# Patient Record
Sex: FEMALE | Employment: FULL TIME | URBAN - METROPOLITAN AREA
[De-identification: names, ages, dates, MRNs, and addresses within clinical notes are randomized per-mention and may not be internally consistent; named-entity substitution may affect disease eponyms.]

---
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
## 2023-07-10 ENCOUNTER — APPOINTMENT (OUTPATIENT)
Dept: CT IMAGING | Facility: CLINIC | Age: 23
End: 2023-07-10
Attending: PHYSICIAN ASSISTANT
Payer: COMMERCIAL

## 2023-07-10 ENCOUNTER — HOSPITAL ENCOUNTER (EMERGENCY)
Facility: CLINIC | Age: 23
Discharge: HOME | End: 2023-07-10
Admitting: PHYSICIAN ASSISTANT
Payer: COMMERCIAL

## 2023-07-10 VITALS
DIASTOLIC BLOOD PRESSURE: 75 MMHG | SYSTOLIC BLOOD PRESSURE: 133 MMHG | OXYGEN SATURATION: 100 % | HEART RATE: 51 BPM | RESPIRATION RATE: 16 BRPM | TEMPERATURE: 97.6 F | BODY MASS INDEX: 22.2 KG/M2 | WEIGHT: 130 LBS | HEIGHT: 64 IN

## 2023-07-10 DIAGNOSIS — S01.112A EYEBROW LACERATION, LEFT, INITIAL ENCOUNTER: ICD-10-CM

## 2023-07-10 DIAGNOSIS — R55 SYNCOPE: ICD-10-CM

## 2023-07-10 LAB
ANION GAP SERPL CALCULATED.3IONS-SCNC: 10 MMOL/L (ref 5–18)
ATRIAL RATE - MUSE: 53 BPM
BASOPHILS # BLD AUTO: 0 10E3/UL (ref 0–0.2)
BASOPHILS NFR BLD AUTO: 0 %
BUN SERPL-MCNC: 10 MG/DL (ref 8–22)
CALCIUM SERPL-MCNC: 9.1 MG/DL (ref 8.5–10.5)
CHLORIDE BLD-SCNC: 106 MMOL/L (ref 98–107)
CO2 SERPL-SCNC: 23 MMOL/L (ref 22–31)
CREAT SERPL-MCNC: 0.78 MG/DL (ref 0.6–1.1)
DIASTOLIC BLOOD PRESSURE - MUSE: NORMAL MMHG
EOSINOPHIL # BLD AUTO: 0.1 10E3/UL (ref 0–0.7)
EOSINOPHIL NFR BLD AUTO: 1 %
ERYTHROCYTE [DISTWIDTH] IN BLOOD BY AUTOMATED COUNT: 12.5 % (ref 10–15)
GFR SERPL CREATININE-BSD FRML MDRD: >90 ML/MIN/1.73M2
GLUCOSE BLD-MCNC: 91 MG/DL (ref 70–125)
HCG SERPL QL: NEGATIVE
HCT VFR BLD AUTO: 40.9 % (ref 35–47)
HGB BLD-MCNC: 14 G/DL (ref 11.7–15.7)
IMM GRANULOCYTES # BLD: 0 10E3/UL
IMM GRANULOCYTES NFR BLD: 0 %
INTERPRETATION ECG - MUSE: NORMAL
LYMPHOCYTES # BLD AUTO: 1.5 10E3/UL (ref 0.8–5.3)
LYMPHOCYTES NFR BLD AUTO: 14 %
MAGNESIUM SERPL-MCNC: 1.9 MG/DL (ref 1.8–2.6)
MCH RBC QN AUTO: 31.2 PG (ref 26.5–33)
MCHC RBC AUTO-ENTMCNC: 34.2 G/DL (ref 31.5–36.5)
MCV RBC AUTO: 91 FL (ref 78–100)
MONOCYTES # BLD AUTO: 0.5 10E3/UL (ref 0–1.3)
MONOCYTES NFR BLD AUTO: 4 %
NEUTROPHILS # BLD AUTO: 8.3 10E3/UL (ref 1.6–8.3)
NEUTROPHILS NFR BLD AUTO: 81 %
NRBC # BLD AUTO: 0 10E3/UL
NRBC BLD AUTO-RTO: 0 /100
P AXIS - MUSE: 45 DEGREES
PLATELET # BLD AUTO: 170 10E3/UL (ref 150–450)
POTASSIUM BLD-SCNC: 4.2 MMOL/L (ref 3.5–5)
PR INTERVAL - MUSE: 130 MS
QRS DURATION - MUSE: 88 MS
QT - MUSE: 416 MS
QTC - MUSE: 390 MS
R AXIS - MUSE: 56 DEGREES
RBC # BLD AUTO: 4.49 10E6/UL (ref 3.8–5.2)
SODIUM SERPL-SCNC: 139 MMOL/L (ref 136–145)
SYSTOLIC BLOOD PRESSURE - MUSE: NORMAL MMHG
T AXIS - MUSE: 34 DEGREES
VENTRICULAR RATE- MUSE: 53 BPM
WBC # BLD AUTO: 10.4 10E3/UL (ref 4–11)

## 2023-07-10 PROCEDURE — 96361 HYDRATE IV INFUSION ADD-ON: CPT

## 2023-07-10 PROCEDURE — 96374 THER/PROPH/DIAG INJ IV PUSH: CPT | Mod: 59

## 2023-07-10 PROCEDURE — 85025 COMPLETE CBC W/AUTO DIFF WBC: CPT | Performed by: EMERGENCY MEDICINE

## 2023-07-10 PROCEDURE — 258N000003 HC RX IP 258 OP 636: Performed by: PHYSICIAN ASSISTANT

## 2023-07-10 PROCEDURE — 84703 CHORIONIC GONADOTROPIN ASSAY: CPT | Performed by: EMERGENCY MEDICINE

## 2023-07-10 PROCEDURE — 12011 RPR F/E/E/N/L/M 2.5 CM/<: CPT

## 2023-07-10 PROCEDURE — 70450 CT HEAD/BRAIN W/O DYE: CPT

## 2023-07-10 PROCEDURE — 82310 ASSAY OF CALCIUM: CPT | Performed by: EMERGENCY MEDICINE

## 2023-07-10 PROCEDURE — 99285 EMERGENCY DEPT VISIT HI MDM: CPT | Mod: 25

## 2023-07-10 PROCEDURE — 250N000013 HC RX MED GY IP 250 OP 250 PS 637: Performed by: PHYSICIAN ASSISTANT

## 2023-07-10 PROCEDURE — 83735 ASSAY OF MAGNESIUM: CPT | Performed by: EMERGENCY MEDICINE

## 2023-07-10 PROCEDURE — 36415 COLL VENOUS BLD VENIPUNCTURE: CPT | Performed by: EMERGENCY MEDICINE

## 2023-07-10 PROCEDURE — 93005 ELECTROCARDIOGRAM TRACING: CPT | Mod: XU | Performed by: EMERGENCY MEDICINE

## 2023-07-10 PROCEDURE — 250N000011 HC RX IP 250 OP 636: Mod: JZ | Performed by: PHYSICIAN ASSISTANT

## 2023-07-10 PROCEDURE — 70486 CT MAXILLOFACIAL W/O DYE: CPT

## 2023-07-10 PROCEDURE — 90471 IMMUNIZATION ADMIN: CPT | Performed by: PHYSICIAN ASSISTANT

## 2023-07-10 PROCEDURE — 90715 TDAP VACCINE 7 YRS/> IM: CPT | Performed by: PHYSICIAN ASSISTANT

## 2023-07-10 RX ORDER — ONDANSETRON 2 MG/ML
4 INJECTION INTRAMUSCULAR; INTRAVENOUS ONCE
Status: COMPLETED | OUTPATIENT
Start: 2023-07-10 | End: 2023-07-10

## 2023-07-10 RX ORDER — IBUPROFEN 600 MG/1
600 TABLET, FILM COATED ORAL ONCE
Status: COMPLETED | OUTPATIENT
Start: 2023-07-10 | End: 2023-07-10

## 2023-07-10 RX ADMIN — ONDANSETRON 4 MG: 2 INJECTION INTRAMUSCULAR; INTRAVENOUS at 16:50

## 2023-07-10 RX ADMIN — IBUPROFEN 600 MG: 600 TABLET ORAL at 20:56

## 2023-07-10 RX ADMIN — CLOSTRIDIUM TETANI TOXOID ANTIGEN (FORMALDEHYDE INACTIVATED), CORYNEBACTERIUM DIPHTHERIAE TOXOID ANTIGEN (FORMALDEHYDE INACTIVATED), BORDETELLA PERTUSSIS TOXOID ANTIGEN (GLUTARALDEHYDE INACTIVATED), BORDETELLA PERTUSSIS FILAMENTOUS HEMAGGLUTININ ANTIGEN (FORMALDEHYDE INACTIVATED), BORDETELLA PERTUSSIS PERTACTIN ANTIGEN, AND BORDETELLA PERTUSSIS FIMBRIAE 2/3 ANTIGEN 0.5 ML: 5; 2; 2.5; 5; 3; 5 INJECTION, SUSPENSION INTRAMUSCULAR at 19:25

## 2023-07-10 RX ADMIN — SODIUM CHLORIDE 1000 ML: 9 INJECTION, SOLUTION INTRAVENOUS at 16:50

## 2023-07-10 ASSESSMENT — ENCOUNTER SYMPTOMS
NECK PAIN: 0
HEADACHES: 1
DIZZINESS: 1
VOMITING: 1
WOUND: 1
CONFUSION: 1
ABDOMINAL PAIN: 1

## 2023-07-10 NOTE — ED TRIAGE NOTES
Pt presents via EMS after having episode of syncope and hitting head on a counter. [facility] having [facility]. Pt was complaining of abdominal cramping and staff stepped out of the room when episode occurred. Pt does not remember what happened. Endorses a lot of abdominal cramping. No cervical tenderness noted     Triage Assessment     Row Name 07/10/23 2412       Triage Assessment (Adult)    Airway WDL WDL       Respiratory WDL    Respiratory WDL WDL       Skin Circulation/Temperature WDL    Skin Circulation/Temperature WDL WDL       Cardiac WDL    Cardiac WDL WDL       Peripheral/Neurovascular WDL    Peripheral Neurovascular WDL WDL       Cognitive/Neuro/Behavioral WDL    Cognitive/Neuro/Behavioral WDL WDL

## 2023-07-10 NOTE — ED PROVIDER NOTES
EMERGENCY DEPARTMENT ENCOUNTER      NAME: Melyssa Kaplan  AGE: 23 year old female  YOB: 2000  MRN: [id number]  EVALUATION DATE & TIME: No admission date for patient encounter.    PCP: Matt Mariscal    ED PROVIDER: Renato Ballard PA-C      Chief Complaint   Patient presents with     Syncope     Head Injury         FINAL IMPRESSION:  1. Syncope    2. Eyebrow laceration, left, initial encounter          ED COURSE & MEDICAL DECISION MAKING:    Pertinent Labs & Imaging studies reviewed. (See chart for details)  4:49 PM I met the patient and performed my initial interview and exam.   7:21 PM I rechecked patient.  8:49 PM We discussed plans for discharge including supportive cares, symptomatic treatment, outpatient follow up, and reasons to return to the emergency department.      23 year old female presents to the Emergency Department for evaluation of syncope, head injury.     ED Course as of 07/10/23 2313   Mon Jul 10, 2023   1700 Patient is a 23-year-old female, presents emergency department from OB/GYN clinic, she had an IUD placed today, reportedly staff stepped outside the room, patient was leaning on the table, and reportedly may have had a syncopal event, falling and striking the left side of her face.  She does have a 1 cm laceration to the eyelid on the left side, as well as some facial tenderness.  She does not remember anything associated with the event.  I was called out to triage for patient as she reportedly had another episode where she was unresponsive, and then vomited significantly.  Not having any fevers.  Feeling much improved now that she is sitting up in wheelchair.  Started some fluids, given seem to Zofran.  Negative pregnancy.  BMP normal, CBC normal, magnesium normal.  EKG was obtained, and does not show any acute abnormalities.  Neuro examination here otherwise grossly unremarkable.  She does not have any focal deficits.  Given all of this, and the fact that she had  another recurrence of nausea, vomiting, will obtain CT of the head, facial bones to ensure there is no fractures.  She does have a laceration that will require some intervention, discussed glue versus stitches.  Unfortunately this laceration is to the skin of the eyelid on the left side, and will be a very difficult closure regardless.  Otherwise normal examination here.  She has no midline cervical, thoracic, or lumbar pain.  No significant abdominal pain, rebound or guarding.  No CVA tenderness.  No other focal symptoms.  Abdomen feeling well up until today.  Plan for fluids, Zofran, imaging, reassessment.   1922 1 cm laceration to the left upper eyelid, was able to be repaired with glue here.  I was able to closely approximate the edges.  Laceration was fairly deep, however came together nicely.  Bleeding was well controlled.  I do not appreciate any foreign body.  Still pending CT scans of the head at this point.   2023 Independently reviewed the patient's CT head, I do not appreciate any intracranial bleeding or hemorrhage.   2046 CT Facial Bones without Contrast  No localized soft tissue swelling, inflammation.  No facial bone fracture or malalignment.  Normal CT of the head, normal CT of the facial bones.   2311 Patient seen and reevaluated following CT scans.  No evidence of any acute intracranial bleeding.  Suspect that she likely has a mild concussion.  Has remained normal here after fluids and Zofran.  Suspect this is likely a syncope, vasovagal episode following the procedure here in clinic today.  No evidence of any abnormal activity such as seizure-like activity, or signs of infection.  Laboratory studies here are stable.  EKG did not show any acute abnormalities.  Head CT and facial bone CT normal.  Laceration was repaired here in the emergency department.  Patient is agreeable with all of these plans, and will discharge home.  We will provide some additional nausea medicine for use over the next  couple days.  Discussed return precautions, patient and family expressed understanding.  Plan for discharge at this time.       Medical Decision Making    History:    Supplemental history from: Family Member/Significant Other    External Record(s) reviewed: Documented in chart, if applicable.    Work Up:    Chart documentation includes differential considered and any EKGs or imaging independently interpreted by provider, where specified.    In additional to work up documented, I considered the following work up: Documented in chart, if applicable.    External consultation:    Discussion of management with another provider: Documented in chart, if applicable    Complicating factors:    Care impacted by chronic illness: N/A    Care affected by social determinants of health: Access to Medical Care    Disposition considerations: Discharge. I prescribed additional prescription strength medication(s) as charted. N/A.         At the conclusion of the encounter I discussed the results of all of the tests and the disposition. The questions were answered. The patient or family acknowledged understanding and was agreeable with the care plan.       0 minutes of critical care time     MEDICATIONS GIVEN IN THE EMERGENCY:  Medications   0.9% sodium chloride BOLUS (0 mLs Intravenous Stopped 7/10/23 1926)   ondansetron (ZOFRAN) injection 4 mg (4 mg Intravenous $Given 7/10/23 1650)   Tdap (tetanus-diphtheria-acell pertussis) (ADACEL) injection 0.5 mL (0.5 mLs Intramuscular $Given 7/10/23 1925)   ibuprofen (ADVIL/MOTRIN) tablet 600 mg (600 mg Oral $Given 7/10/23 2056)       NEW PRESCRIPTIONS STARTED AT TODAY'S ER VISIT  Discharge Medication List as of 7/10/2023  8:57 PM             =================================================================    HPI    Patient information was obtained from: patient    Use of : N/A       Melyssa Kaplan is a 23 year old female with a pertinent history of kidney stones, hx of blood  "transfusions, hx of lyme diseases, and elevated blood pressure readings who presents to this ED via walk-in for evaluation of syncope.    Patient had a syncopal episode after having an IUD placed [facility] clinic. She reports she sat up from the exam table and felt  Dizzy prior to onset. She does not remember passing out but she recalls hearing her mother enter the room following the episode. Patient's mother was outside the room when she heard a \"thud\".  She hit her head against the cabinet, per mother. She developed blurry vision and states she saw \"hundreds of hands\". She sustained a laceration on the left eyelid. Patient reports feeling confused and dazed. She has suprapubic abdominal cramping.    In the ED, the patient had another syncopal episode subsequently waking up and vomiting. Patient developed a headache. She endorses off and on vision blurriness. Denies neck pain and any other symptoms. Her last tetanus shot was in 2013.    REVIEW OF SYSTEMS   Review of Systems   Eyes: Positive for visual disturbance (blurriness).   Gastrointestinal: Positive for abdominal pain (suprapubic cramping) and vomiting.   Musculoskeletal: Negative for neck pain.   Skin: Positive for wound (laceration to right eyelid).   Neurological: Positive for dizziness, syncope (2x) and headaches.   Psychiatric/Behavioral: Positive for confusion.   All other systems reviewed and are negative.       PAST MEDICAL HISTORY:  Past Medical History:   Diagnosis Date     Bilateral ureteral duplication     2 ureteral orifices on each side on cystoscopy, s/p reimplantation     History of blood transfusion 02/26/2003    Thought due to viral suppression     Kidney stone 06/19/2018     Lyme disease 2007    Required hospitalization     VUR (vesicoureteric reflux)     Grade IV lower pole, Grade III upper and lower pole reflux s/p reimplantation, Dr. Abebe       PAST SURGICAL HISTORY:  Past Surgical History:   Procedure Laterality Date     ZZC " "REIMPLANTATION OF KIDNEY Bilateral 02/21/2003           CURRENT MEDICATIONS:    Calcium-Magnesium-Vitamin D (CALCIUM 500 PO)  cholecalciferol (VITAMIN D-1000 MAX ST) 1000 units TABS  ESTARYLLA 0.25-35 MG-MCG tablet         ALLERGIES:  Allergies   Allergen Reactions     Amoxicillin Hives     Penicillins Hives       FAMILY HISTORY:  Family History   Problem Relation Age of Onset     Genitourinary Problems Sister         hydronephrosis in utero, no surgery needed     Kidney Disease Paternal Grandfather         Sanders's disease     Nephrolithiasis Paternal Grandfather        SOCIAL HISTORY:   Social History     Socioeconomic History     Marital status: Single   Tobacco Use     Smoking status: Never     Smokeless tobacco: Never   Social History Narrative    Melyssa lives with both parents and 2 younger siblings. She will be a Senior and is active in [employer]. 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        VITALS:  /75   Pulse 51   Temp 97.6  F (36.4  C) (Temporal)   Resp 16   Ht 1.626 m (5' 4\")   Wt 59 kg (130 lb)   SpO2 100%   BMI 22.31 kg/m      PHYSICAL EXAM    Physical Exam  Vitals and nursing note reviewed.   Constitutional:       General: She is not in acute distress.     Appearance: Normal appearance. She is normal weight. She is not toxic-appearing or diaphoretic.   HENT:      Head: Normocephalic.      Right Ear: External ear normal.      Left Ear: External ear normal.   Eyes:      General: No visual field deficit.     Extraocular Movements: Extraocular movements intact.      Pupils: Pupils are equal, round, and reactive to light.   Cardiovascular:      Rate and Rhythm: Normal rate and regular rhythm.      Heart sounds: Normal heart sounds. No murmur heard.     No friction rub. No gallop.   Pulmonary:      Effort: Pulmonary effort is normal. No respiratory distress.      Breath sounds: No wheezing.   Abdominal:      General: Abdomen is flat. Bowel sounds are normal. There is no " distension.      Palpations: Abdomen is soft.      Tenderness: There is no abdominal tenderness. There is no right CVA tenderness, left CVA tenderness, guarding or rebound.   Musculoskeletal:      Cervical back: No tenderness.      Right lower leg: No edema.      Left lower leg: No edema.   Skin:     General: Skin is warm.      Findings: Laceration present. No erythema or rash.      Comments: Laceration present above the left eye, approximately 1 cm long.   Neurological:      Mental Status: She is alert. Mental status is at baseline.      Sensory: No sensory deficit.      Motor: No weakness.         LAB:  All pertinent labs reviewed and interpreted.  Labs Ordered and Resulted from Time of ED Arrival to Time of ED Departure   BASIC METABOLIC PANEL - Normal       Result Value    Sodium 139      Potassium 4.2      Chloride 106      Carbon Dioxide (CO2) 23      Anion Gap 10      Urea Nitrogen 10      Creatinine 0.78      Calcium 9.1      Glucose 91      GFR Estimate >90     MAGNESIUM - Normal    Magnesium 1.9     HCG QUALITATIVE PREGNANCY - Normal    hCG Serum Qualitative Negative     CBC WITH PLATELETS AND DIFFERENTIAL    WBC Count 10.4      RBC Count 4.49      Hemoglobin 14.0      Hematocrit 40.9      MCV 91      MCH 31.2      MCHC 34.2      RDW 12.5      Platelet Count 170      % Neutrophils 81      % Lymphocytes 14      % Monocytes 4      % Eosinophils 1      % Basophils 0      % Immature Granulocytes 0      NRBCs per 100 WBC 0      Absolute Neutrophils 8.3      Absolute Lymphocytes 1.5      Absolute Monocytes 0.5      Absolute Eosinophils 0.1      Absolute Basophils 0.0      Absolute Immature Granulocytes 0.0      Absolute NRBCs 0.0         RADIOLOGY:  Reviewed all pertinent imaging. Please see official radiology report.  CT Facial Bones without Contrast   Final Result   IMPRESSION:   HEAD CT:   1.  No acute intracranial process.      FACIAL BONE CT:   1.  No facial bone or mandibular fracture.         Head CT w/o  contrast   Final Result   IMPRESSION:   HEAD CT:   1.  No acute intracranial process.      FACIAL BONE CT:   1.  No facial bone or mandibular fracture.           PROCEDURES:   PROCEDURE: Laceration Repair   INDICATIONS: Laceration   PROCEDURE PROVIDER: Jeffery Ballard PA-C   SITE: Left eyebrow laceration   TYPE/SIZE: simple, clean and no foreign body visualized  1 cm (total length)   FUNCTIONAL ASSESSMENT: Distal sensation, circulation and motor intact   MEDICATION: 0 mLs of 1% Lidocaine without epinephrine   PREPARATION: scrubbing and irrigation with Normal saline and Hibiclens   DEBRIDEMENT: no debridement   CLOSURE:  Superficial layer closed with Dermabond (medical glue)    Total number of sutures/staples placed: 0     Malia SCHMIDT, am serving as a scribe to document services personally performed by Renato Ballard PA-C, based on my observation and the provider's statements to me. I, Renato Ballard PA-C, attest that Malia Khan is acting in a scribe capacity, has observed my performance of the services and has documented them in accordance with my direction.    Renato Ballard PA-C  Emergency Medicine  [facility] EMERGENCY ROOM  [address] [facility] [id number] [phone]  Dept: [phone]       Renato Ballrad PA-C  07/10/23 1887

## 2023-07-11 NOTE — DISCHARGE INSTRUCTIONS
You are seen here in the emergency department for evaluation of syncope, fall, head injury.  Your CT scans here did not show any acute abnormalities.  You likely have a mild concussion, continue to monitor your symptoms at home, and return to the emergency department with any new or worsening symptoms.  You continue to use ibuprofen or Tylenol at home for pain.  We closed your laceration above your left eye with glue, this will fall off over the next 10 days.  Do not pick at it.  He can shower and bathe as normal.  No swimming in lakes or ponds, or in hot tubs.  Please follow with your primary care doctor as needed, return to the emergency department any new or worsening symptoms such as weakness, persistent vomiting, blurry vision, double vision, or significant changes in your vision.    For pain or fever you may use:  -Tylenol 650 mg every 6 hours.  Max 4000 mg in 24 hours  Do not use thismedication with alcohol as it can cause liver problems.  -Ibuprofen 600 mg every 6 hours.  Max 3500 mg in 24 hours  Do not take this medication if you have a history of a GI bleed or have kidney problems.  You may use both of these medications at the same time or you can alternate them every 3 hours.  For example, Tylenol at 6 AM, ibuprofen at 9 AM, Tylenol at noon, etc.